# Patient Record
Sex: FEMALE | Race: BLACK OR AFRICAN AMERICAN | Employment: FULL TIME | URBAN - METROPOLITAN AREA
[De-identification: names, ages, dates, MRNs, and addresses within clinical notes are randomized per-mention and may not be internally consistent; named-entity substitution may affect disease eponyms.]

---

## 2017-10-26 ENCOUNTER — OFFICE VISIT (OUTPATIENT)
Dept: FAMILY MEDICINE CLINIC | Age: 32
End: 2017-10-26

## 2017-10-26 VITALS
WEIGHT: 156 LBS | SYSTOLIC BLOOD PRESSURE: 115 MMHG | OXYGEN SATURATION: 100 % | TEMPERATURE: 98.9 F | RESPIRATION RATE: 18 BRPM | HEART RATE: 89 BPM | BODY MASS INDEX: 22.33 KG/M2 | DIASTOLIC BLOOD PRESSURE: 73 MMHG | HEIGHT: 70 IN

## 2017-10-26 DIAGNOSIS — O99.330 TOBACCO USE DURING PREGNANCY, ANTEPARTUM: ICD-10-CM

## 2017-10-26 DIAGNOSIS — N92.6 MISSED MENSES: ICD-10-CM

## 2017-10-26 DIAGNOSIS — Z32.01 POSITIVE PREGNANCY TEST: Primary | ICD-10-CM

## 2017-10-26 DIAGNOSIS — F32.A DEPRESSION, UNSPECIFIED DEPRESSION TYPE: ICD-10-CM

## 2017-10-26 LAB
HCG URINE, QL. (POC): POSITIVE
VALID INTERNAL CONTROL?: YES

## 2017-10-26 NOTE — PROGRESS NOTES
HISTORY OF PRESENT ILLNESS  Mumtaz Coelho is a 27 y.o. female. Chief Complaint   Patient presents with    New Patient     establishing care     Missed Menses     Pos pregnancy test at home, LMP 8-15-17       HPI   Pt is here to establish. Pt reports that she has not seen a pcp in her adult life. She last saw an ob/gyn with the pregnancy of her now 8 yr old son. New patient here today due to positive pregnancy at home. Patient's last menstrual period was 08/15/2017. This was not a regular cycle. Pt suspects she conceived around the end of July 2017. She had morning sickness all day during the first trimester but has only had a little nausea in the morning. Pt has been dealing with depression. She has not been treated with meds or counseling in the past.  She notes it is more difficult with the pregnancy. Review of Systems   Constitutional: Negative. HENT: Negative. Respiratory: Negative. Cardiovascular: Negative. Gastrointestinal: Positive for nausea. Psychiatric/Behavioral: Positive for depression. All other systems reviewed and are negative. Physical Exam  Physical Exam   Nursing note and vitals reviewed. Constitutional: She is oriented to person, place, and time. She appears well-developed and well-nourished. HENT:   Head: Normocephalic and atraumatic. Right Ear: External ear normal.   Left Ear: External ear normal.   Nose: Nose normal.   Eyes: Conjunctivae and EOM are normal.   Neck: Normal range of motion. Neck supple. No JVD present. Carotid bruit is not present. No thyromegaly present. Cardiovascular: Normal rate, regular rhythm, normal heart sounds and intact distal pulses. Exam reveals no gallop and no friction rub. No murmur heard. Pulmonary/Chest: Effort normal and breath sounds normal. She has no wheezes. She has no rhonchi. She has no rales. Abdominal: Soft. Bowel sounds are normal.   Musculoskeletal: Normal range of motion.    Neurological: She is alert and oriented to person, place, and time. Coordination normal.   Skin: Skin is warm and dry. Psychiatric: She has a normal mood and affect. Her behavior is normal. Judgment and thought content normal.     ASSESSMENT and PLAN  Diagnoses and all orders for this visit:    1. Positive pregnancy test  -     AMB POC URINE PREGNANCY TEST, VISUAL COLOR COMPARISON  Long discussion re: healthy pregnancy including: strategies to minimize morning sickness; avoidance of high risk activities, etoh, drugs, nsaids, and tobacco; use of prenatal vitamins until d/c of lactation or 6 wks after delivery if not breastfeeding; importance of exercise and good nutrition; and following advice of pt's ob. Pt referred to ob/gyn. Pt to call for appt. Dating unclear. Pt advised that ultrasound will help confirm dating. 2. Missed menses  -     AMB POC URINE PREGNANCY TEST, VISUAL COLOR COMPARISON    3. Depression:   Contact info given for Dr Leobardo Birch. Discussed importance of counseling. Will hold off on meds for now; defer to ob/gyn preferred med in pregnancy if still needed after therapy is started. 4. Tobacco use during pregnancy, trimester unspecified  Pt encouraged to work on cessation.      Follow-up Disposition: prn

## 2017-10-26 NOTE — PROGRESS NOTES
Ruddy Gonzalez 27 y.o. female   Chief Complaint   Patient presents with    New Patient     establishing care     Missed Menses     Pos pregnancy test at home, LMP 8-15-17

## 2018-04-29 PROBLEM — Z34.93 THIRD TRIMESTER PREGNANCY: Status: ACTIVE | Noted: 2018-04-29

## 2019-11-19 ENCOUNTER — PATIENT OUTREACH (OUTPATIENT)
Dept: FAMILY MEDICINE CLINIC | Age: 34
End: 2019-11-19

## 2019-11-21 ENCOUNTER — PATIENT OUTREACH (OUTPATIENT)
Dept: FAMILY MEDICINE CLINIC | Age: 34
End: 2019-11-21